# Patient Record
Sex: FEMALE
[De-identification: names, ages, dates, MRNs, and addresses within clinical notes are randomized per-mention and may not be internally consistent; named-entity substitution may affect disease eponyms.]

---

## 2019-04-03 ENCOUNTER — HOSPITAL ENCOUNTER (OUTPATIENT)
Dept: HOSPITAL 53 - M SMT | Age: 39
End: 2019-04-03
Attending: INTERNAL MEDICINE

## 2019-04-03 DIAGNOSIS — M51.36: Primary | ICD-10-CM

## 2019-04-03 NOTE — REP
PARTIAL LUMBAR SPINE, THREE VIEWS:

 

HISTORY:  Degenerative disc disease.

 

There is no acute fracture or subluxation.  The L2-3 through L5-S1 intervertebral

discs are decreased in height consistent with disc degeneration.  Osteophytes are

present on L3-5.  There is loss of the normal lordotic curve.  There is partial

sacralization of the L5 vertebral body.

 

IMPRESSION:

 

Degenerative change as described above.

 

 

Electronically Signed by

Sampson Guaman MD 04/03/2019 03:44 P

## 2022-10-04 ENCOUNTER — HOSPITAL ENCOUNTER (OUTPATIENT)
Dept: HOSPITAL 53 - M PLAIMG | Age: 42
End: 2022-10-04
Attending: INTERNAL MEDICINE

## 2022-10-04 DIAGNOSIS — M54.50: Primary | ICD-10-CM
